# Patient Record
Sex: FEMALE | Race: WHITE | NOT HISPANIC OR LATINO | URBAN - METROPOLITAN AREA
[De-identification: names, ages, dates, MRNs, and addresses within clinical notes are randomized per-mention and may not be internally consistent; named-entity substitution may affect disease eponyms.]

---

## 2017-07-06 ENCOUNTER — ALLSCRIPTS OFFICE VISIT (OUTPATIENT)
Dept: OTHER | Facility: OTHER | Age: 57
End: 2017-07-06

## 2017-09-28 ENCOUNTER — ALLSCRIPTS OFFICE VISIT (OUTPATIENT)
Dept: OTHER | Facility: OTHER | Age: 57
End: 2017-09-28

## 2018-01-13 VITALS
DIASTOLIC BLOOD PRESSURE: 86 MMHG | SYSTOLIC BLOOD PRESSURE: 140 MMHG | WEIGHT: 150 LBS | HEIGHT: 67 IN | BODY MASS INDEX: 23.54 KG/M2

## 2018-01-14 VITALS
HEIGHT: 67 IN | WEIGHT: 147.5 LBS | DIASTOLIC BLOOD PRESSURE: 88 MMHG | BODY MASS INDEX: 23.15 KG/M2 | SYSTOLIC BLOOD PRESSURE: 148 MMHG

## 2021-04-08 DIAGNOSIS — Z23 ENCOUNTER FOR IMMUNIZATION: ICD-10-CM

## 2021-04-13 ENCOUNTER — IMMUNIZATIONS (OUTPATIENT)
Dept: FAMILY MEDICINE CLINIC | Facility: HOSPITAL | Age: 61
End: 2021-04-13

## 2021-04-13 DIAGNOSIS — Z23 ENCOUNTER FOR IMMUNIZATION: Primary | ICD-10-CM

## 2021-04-13 PROCEDURE — 0011A SARS-COV-2 / COVID-19 MRNA VACCINE (MODERNA) 100 MCG: CPT

## 2021-04-13 PROCEDURE — 91301 SARS-COV-2 / COVID-19 MRNA VACCINE (MODERNA) 100 MCG: CPT

## 2021-05-13 ENCOUNTER — IMMUNIZATIONS (OUTPATIENT)
Dept: FAMILY MEDICINE CLINIC | Facility: HOSPITAL | Age: 61
End: 2021-05-13

## 2021-05-13 DIAGNOSIS — Z23 ENCOUNTER FOR IMMUNIZATION: Primary | ICD-10-CM

## 2021-05-13 PROCEDURE — 91301 SARS-COV-2 / COVID-19 MRNA VACCINE (MODERNA) 100 MCG: CPT

## 2021-05-13 PROCEDURE — 0012A SARS-COV-2 / COVID-19 MRNA VACCINE (MODERNA) 100 MCG: CPT

## 2022-10-27 ENCOUNTER — TELEPHONE (OUTPATIENT)
Dept: GASTROENTEROLOGY | Facility: CLINIC | Age: 62
End: 2022-10-27

## 2022-10-27 NOTE — TELEPHONE ENCOUNTER
Recall ltr for colon with Dr Parker Links due to hx of ta,tva polyps  Dad and sister-colon ca, personal hx of breast ca , pt due 11/21/22

## 2022-11-07 ENCOUNTER — PREP FOR PROCEDURE (OUTPATIENT)
Dept: GASTROENTEROLOGY | Facility: CLINIC | Age: 62
End: 2022-11-07

## 2022-11-07 ENCOUNTER — TELEPHONE (OUTPATIENT)
Dept: GASTROENTEROLOGY | Facility: CLINIC | Age: 62
End: 2022-11-07

## 2022-11-07 DIAGNOSIS — Z86.010 HISTORY OF COLON POLYPS: Primary | ICD-10-CM

## 2022-11-07 NOTE — TELEPHONE ENCOUNTER
11/07/22  Screened by: Cassie Soria    Referring Provider Dr Jada Dorado    Pre- Screening: There is no height or weight on file to calculate BMI  Has patient been referred for a routine screening Colonoscopy? yes  Is the patient between 39-70 years old? yes      Previous Colonoscopy yes   If yes:    Date: 11/2019    Facility:     Reason:       SCHEDULING STAFF: If the patient is between 39yrs-47yrs, please advise patient to confirm benefits/coverage with their insurance company for a routine screening colonoscopy, some insurance carriers will only cover at [address] or older  If the patient is over 66years old, please schedule an office visit  Does the patient want to see a Gastroenterologist prior to their procedure OR are they having any GI symptoms? no    Has the patient been hospitalized or had abdominal surgery in the past 6 months? no    Does the patient use supplemental oxygen? no    Does the patient take Coumadin, Lovenox, Plavix, Elliquis, Xarelto, or other blood thinning medication? no    Has the patient had a stroke, cardiac event, or stent placed in the past year? no    SCHEDULING STAFF: If patient answers NO to above questions, then schedule procedure  If patient answers YES to above questions, then schedule office appointment  Pt passed OA    If patient is between 45yrs - 49yrs, please advise patient that we will have to confirm benefits & coverage with their insurance company for a routine screening colonoscopy

## 2022-11-07 NOTE — TELEPHONE ENCOUNTER
Scheduled date of colonoscopy (as of today): 11/29/22    Physician performing colonoscopy: Dr Dominga Abebe    [facility]    Bowel prep reviewed with patient: Romelia    Instructions reviewed with patient by:Rohan    Clearances: N/A

## 2022-11-10 ENCOUNTER — TELEPHONE (OUTPATIENT)
Dept: GASTROENTEROLOGY | Facility: CLINIC | Age: 62
End: 2022-11-10

## 2022-11-10 NOTE — TELEPHONE ENCOUNTER
I lmom for pt to please call back to r/s her procedure on 11/29/22 with Dr Ricardo Palacios at [place] [address] due to schedule change  As of right now, Dr Marco Troy does have 11/30/22 open at [place] [address] which can be offered to pt if she calls back or Dr Ricardo Palacios is scheduling in January  I will call pt again in one week if do not hear back from her to try to r/s

## 2022-11-11 ENCOUNTER — PREP FOR PROCEDURE (OUTPATIENT)
Dept: GASTROENTEROLOGY | Facility: CLINIC | Age: 62
End: 2022-11-11

## 2022-11-11 ENCOUNTER — TELEPHONE (OUTPATIENT)
Dept: GASTROENTEROLOGY | Facility: CLINIC | Age: 62
End: 2022-11-11

## 2022-11-11 DIAGNOSIS — Z86.010 HISTORY OF COLON POLYPS: Primary | ICD-10-CM

## 2022-11-11 NOTE — TELEPHONE ENCOUNTER
Scheduled date of colonoscopy (as of today):2/21/23  Physician performing colonoscopy:Dr Christy Mooney  Location of colonoscopy:[facility]  Bowel prep reviewed with patient:Romelia  Instructions reviewed with patient by:DELPHINE  Clearances: NA    Pt is rescheduled due to change in providers schedule   Pt wanted to stick with Christy Mooney

## 2022-11-11 NOTE — TELEPHONE ENCOUNTER
Dr Ana Maria Toro has [address] on 11/30/22 open as of right now  I lmom for pt to please call me back to let me know is she is willing to have procedure that date with Dr Ana Maria Toro since Dr Vinita Briseno is scheduling into January  Will call pt again in one week if do not hear back from her

## 2023-02-09 ENCOUNTER — TELEPHONE (OUTPATIENT)
Dept: GASTROENTEROLOGY | Facility: CLINIC | Age: 63
End: 2023-02-09

## 2023-02-09 DIAGNOSIS — Z12.11 ENCOUNTER FOR SCREENING COLONOSCOPY: Primary | ICD-10-CM

## 2023-02-09 NOTE — TELEPHONE ENCOUNTER
Spoke with pt reminding her of her colonoscopy 2/22 with Dr Beatriz Quintanilla at [facility]  Her  is her , she has instructions but no prep and will be called day prior with her arrival time  I sent message to Dr Beatriz Quintanilla to send golytely into her pharmacy

## 2023-02-22 ENCOUNTER — ANESTHESIA (OUTPATIENT)
Dept: GASTROENTEROLOGY | Facility: AMBULATORY SURGERY CENTER | Age: 63
End: 2023-02-22

## 2023-02-22 ENCOUNTER — ANESTHESIA EVENT (OUTPATIENT)
Dept: GASTROENTEROLOGY | Facility: AMBULATORY SURGERY CENTER | Age: 63
End: 2023-02-22

## 2023-02-22 ENCOUNTER — HOSPITAL ENCOUNTER (OUTPATIENT)
Dept: GASTROENTEROLOGY | Facility: AMBULATORY SURGERY CENTER | Age: 63
Discharge: HOME | End: 2023-02-22

## 2023-02-22 VITALS
OXYGEN SATURATION: 100 % | WEIGHT: 142 LBS | RESPIRATION RATE: 18 BRPM | TEMPERATURE: 97.4 F | HEART RATE: 50 BPM | DIASTOLIC BLOOD PRESSURE: 67 MMHG | BODY MASS INDEX: 22.82 KG/M2 | HEIGHT: 66 IN | SYSTOLIC BLOOD PRESSURE: 154 MMHG

## 2023-02-22 DIAGNOSIS — Z86.010 HISTORY OF COLON POLYPS: ICD-10-CM

## 2023-02-22 RX ORDER — PROPOFOL 10 MG/ML
INJECTION, EMULSION INTRAVENOUS AS NEEDED
Status: DISCONTINUED | OUTPATIENT
Start: 2023-02-22 | End: 2023-02-22

## 2023-02-22 RX ORDER — SODIUM CHLORIDE, SODIUM LACTATE, POTASSIUM CHLORIDE, CALCIUM CHLORIDE 600; 310; 30; 20 MG/100ML; MG/100ML; MG/100ML; MG/100ML
100 INJECTION, SOLUTION INTRAVENOUS CONTINUOUS
Status: DISCONTINUED | OUTPATIENT
Start: 2023-02-22 | End: 2023-02-26 | Stop reason: HOSPADM

## 2023-02-22 RX ORDER — SODIUM CHLORIDE, SODIUM LACTATE, POTASSIUM CHLORIDE, CALCIUM CHLORIDE 600; 310; 30; 20 MG/100ML; MG/100ML; MG/100ML; MG/100ML
20 INJECTION, SOLUTION INTRAVENOUS CONTINUOUS
Status: DISCONTINUED | OUTPATIENT
Start: 2023-02-22 | End: 2023-02-26 | Stop reason: HOSPADM

## 2023-02-22 RX ORDER — LIDOCAINE HYDROCHLORIDE 10 MG/ML
INJECTION, SOLUTION EPIDURAL; INFILTRATION; INTRACAUDAL; PERINEURAL AS NEEDED
Status: DISCONTINUED | OUTPATIENT
Start: 2023-02-22 | End: 2023-02-22

## 2023-02-22 RX ADMIN — PROPOFOL 30 MG: 10 INJECTION, EMULSION INTRAVENOUS at 07:35

## 2023-02-22 RX ADMIN — PROPOFOL 30 MG: 10 INJECTION, EMULSION INTRAVENOUS at 07:37

## 2023-02-22 RX ADMIN — PROPOFOL 30 MG: 10 INJECTION, EMULSION INTRAVENOUS at 07:48

## 2023-02-22 RX ADMIN — PROPOFOL 20 MG: 10 INJECTION, EMULSION INTRAVENOUS at 07:52

## 2023-02-22 RX ADMIN — PROPOFOL 30 MG: 10 INJECTION, EMULSION INTRAVENOUS at 07:39

## 2023-02-22 RX ADMIN — LIDOCAINE HYDROCHLORIDE 50 MG: 10 INJECTION, SOLUTION EPIDURAL; INFILTRATION; INTRACAUDAL; PERINEURAL at 07:33

## 2023-02-22 RX ADMIN — LIDOCAINE HYDROCHLORIDE 20 MG: 10 INJECTION, SOLUTION EPIDURAL; INFILTRATION; INTRACAUDAL; PERINEURAL at 07:35

## 2023-02-22 RX ADMIN — SODIUM CHLORIDE, SODIUM LACTATE, POTASSIUM CHLORIDE, CALCIUM CHLORIDE: 600; 310; 30; 20 INJECTION, SOLUTION INTRAVENOUS at 07:30

## 2023-02-22 RX ADMIN — PROPOFOL 80 MG: 10 INJECTION, EMULSION INTRAVENOUS at 07:33

## 2023-02-22 RX ADMIN — PROPOFOL 40 MG: 10 INJECTION, EMULSION INTRAVENOUS at 07:44

## 2023-02-22 RX ADMIN — PROPOFOL 40 MG: 10 INJECTION, EMULSION INTRAVENOUS at 07:41

## 2023-02-22 NOTE — INTERVAL H&P NOTE
H&P reviewed  After examining the patient I find no changes in the patients condition since the H&P had been written      Vitals:    02/22/23 0709   BP: 139/80   Pulse: 68   Resp: 18   Temp: (!) 97 4 °F (36 3 °C)   SpO2: 98%

## 2023-02-22 NOTE — ANESTHESIA PREPROCEDURE EVALUATION
Procedure:  COLONOSCOPY    Relevant Problems   No relevant active problems      64 yo F with PMH BRCA1+ mutation, R breast cancer s/p bilateral total mastectomies with reconstruction (2018) s/p prophylactic laparoscopic BSO  Physical Exam    Airway    Mallampati score: I  TM Distance: >3 FB  Neck ROM: full     Dental   No notable dental hx     Cardiovascular      Pulmonary      Other Findings        Anesthesia Plan  ASA Score- 2     Anesthesia Type- IV sedation with anesthesia with ASA Monitors  Additional Monitors:   Airway Plan:           Plan Factors-Exercise tolerance (METS): >4 METS  Chart reviewed  Patient summary reviewed  Patient is not a current smoker  Obstructive sleep apnea risk education given perioperatively  Induction- intravenous  Postoperative Plan-     Informed Consent- Anesthetic plan and risks discussed with patient  I personally reviewed this patient with the CRNA  Discussed and agreed on the Anesthesia Plan with the CRNA  Maria Elena Davila

## 2023-02-22 NOTE — H&P
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     Past Surgical History:   Procedure Laterality Date   • BILATERAL OOPHORECTOMY      and salphingectomy B/L   •  SECTION     • COLONOSCOPY     • FOOT SURGERY  2010   • MASTECTOMY Bilateral 2018     Social History   Social History     Substance and Sexual Activity   Alcohol Use Yes   • Alcohol/week: 3 0 standard drinks   • Types: 3 Glasses of wine per week    Comment: wine     Social History     Substance and Sexual Activity   Drug Use Never     Social History     Tobacco Use   Smoking Status Never   Smokeless Tobacco Never     Family History   Problem Relation Age of Onset   • Heart disease Mother    • Colon cancer Father    • Arthritis Family    • Cancer Family        Meds/Allergies       Current Outpatient Medications:   •  anastrozole (ARIMIDEX) 1 mg tablet  •  Cholecalciferol (VITAMIN D3 PO)  •  Multiple Vitamins-Minerals (MULTIVITAMIN WOMENS 50+ ADV PO)  •  Multiple Vitamins-Minerals (ZINC PO)    Current Facility-Administered Medications:   •  lactated ringers infusion, 20 mL/hr, Intravenous, Continuous  •  lactated ringers infusion, 100 mL/hr, Intravenous, Continuous    No Known Allergies    Objective     /80   Pulse 68   Temp (!) 97 4 °F (36 3 °C) (Skin)   Resp 18   Ht 5' 6" (1 676 m)   Wt 64 4 kg (142 lb)   SpO2 98%   BMI 22 92 kg/m²       PHYSICAL EXAM    Gen: NAD  Head: NCAT  CV: RRR  CHEST: Clear  ABD: soft, NT/ND  EXT: no edema      ASSESSMENT/PLAN:  This is a 65y o  year old female here for colonoscopy, and she is stable and optimized for her procedure

## 2023-02-22 NOTE — DISCHARGE SUMMARY
Discharge Summary - Alexx Benites 65 y o  female MRN: [id number]    Unit/Bed#:  Encounter: [id number]    Admission Date:  2/22/2023    Admitting Diagnosis: History of colon polyps [Z86 010]    HPI: History of colon polyp  Family history of colon cancer  Procedures Performed: No orders of the defined types were placed in this encounter  Summary of Hospital Course: Tolerated procedure with    Significant Findings, Care, Treatment and Services Provided: Small polypoid area in the ascending colon was noted  On biopsy there was bleeding consistent with telangiectasia  Hemostatic clip was applied  Complications: None, see above  Discharge Diagnosis: See above    Medical Problems     Resolved Problems  Date Reviewed: 1/3/2020   None         Condition at Discharge: good         Discharge instructions/Information to patient and family:   See after visit summary for information provided to patient and family  Provisions for Follow-Up Care:  See after visit summary for information related to follow-up care and any pertinent home health orders        PCP: Edna Alvarado MD    Disposition: Home